# Patient Record
Sex: MALE | Race: WHITE | URBAN - METROPOLITAN AREA
[De-identification: names, ages, dates, MRNs, and addresses within clinical notes are randomized per-mention and may not be internally consistent; named-entity substitution may affect disease eponyms.]

---

## 2024-01-25 ENCOUNTER — OFFICE VISIT (OUTPATIENT)
Dept: SURGERY | Facility: CLINIC | Age: 44
End: 2024-01-25

## 2024-01-25 DIAGNOSIS — N41.1 CHRONIC PROSTATITIS: ICD-10-CM

## 2024-01-25 DIAGNOSIS — R82.90 URINE FINDING: Primary | ICD-10-CM

## 2024-01-25 LAB
APPEARANCE: CLEAR
BILIRUBIN: NEGATIVE
GLUCOSE (URINE DIPSTICK): NEGATIVE MG/DL
KETONES (URINE DIPSTICK): NEGATIVE MG/DL
LEUKOCYTES: NEGATIVE
MULTISTIX LOT#: NORMAL NUMERIC
NITRITE, URINE: NEGATIVE
OCCULT BLOOD: NEGATIVE
PH, URINE: 7 (ref 4.5–8)
PROTEIN (URINE DIPSTICK): NEGATIVE MG/DL
SPECIFIC GRAVITY: 1.01 (ref 1–1.03)
URINE-COLOR: YELLOW
UROBILINOGEN,SEMI-QN: 0.2 MG/DL (ref 0–1.9)

## 2024-01-25 PROCEDURE — 81003 URINALYSIS AUTO W/O SCOPE: CPT | Performed by: SURGERY

## 2024-01-25 PROCEDURE — 99204 OFFICE O/P NEW MOD 45 MIN: CPT | Performed by: SURGERY

## 2024-01-25 RX ORDER — TAMSULOSIN HYDROCHLORIDE 0.4 MG/1
0.4 CAPSULE ORAL EVERY EVENING
Qty: 30 CAPSULE | Refills: 5 | Status: SHIPPED | OUTPATIENT
Start: 2024-01-25

## 2024-01-25 NOTE — PROGRESS NOTES
Urology Clinic Note    Primary Care Provider:  No primary care provider on file.     Chief Complaint:   Prostatitis, pelvic pain and pressure    HPI:   Timothy Houser is a 43 year old male with no significant PMH referred for prostatitis, pelvic pain and pressure.    He had an episode of more severe perineal pain and pressure back in August 2023.  It resolved after his PCP who prescribed him 1 month of antibiotics mostly, but since then he has been having intermittent perineal pressure.  He feels that this is not really made better by urination.  Slight improvement after ejaculation.  He denies gross hematuria, weak urinary stream, urinary urgency, frequency.  He denies constipation.  He does not sit for long periods of time mostly and works as a .    BRENTON today shows an approximately 40 g prostate with no nodules or tenderness, mildly  enlarged for his age.    He has a family history of breast cancer in his maternal aunt, no prostate cancer in his family.    Urinalysis: Negative    PSA:  No results found for: \"PSA\", \"PERCENTPSA\", \"PSAS\", \"PSAULTRA\", \"QPSA\", \"PSATOT\", \"TOTPSADX\", \"TOTPSASCREEN\"     History:   No past medical history on file.    No past surgical history on file.    No family history on file.    Social History     Socioeconomic History    Marital status: Unknown       Medications (Active prior to today's visit):  No current outpatient medications on file.       Allergies:  Not on File    Review of Systems:   A comprehensive 10-point review of systems was completed.  Pertinent positives and negatives are noted in the the HPI.    Physical Exam:   CONSTITUTIONAL: Well developed, well nourished, in no acute distress  NEUROLOGIC: Alert and oriented  HEAD: Normocephalic, atraumatic  EYES: Sclera non-icteric  ENT: Hearing intact, moist mucous membranes  NECK: No obvious goiter or masses  RESPIRATORY: Normal respiratory effort  SKIN: No evident rashes  ABDOMEN: Soft, non-tender,  non-distended  GENITOURINARY: Normal phallus, orthotopic meatus, normal bilateral testicles  DIGITAL RECTAL EXAM: ~40 gram prostate, no nodules or tenderness    Assessment & Plan:   Timothy Houser is a 43 year old male with no significant PMH referred for prostatitis, pelvic pain and pressure.    He had an episode of more severe perineal pain and pressure back in August 2023.  It resolved after his PCP who prescribed him 1 month of antibiotics mostly, but since then he has been having intermittent perineal pressure.  He feels that this is not really made better by urination.  Slight improvement after ejaculation.  He denies gross hematuria, weak urinary stream, urinary urgency, frequency.  He denies constipation.  He does not sit for long periods of time mostly and works as a .    BRENTON today shows an approximately 40 g prostate with no nodules or tenderness, mildly  enlarged for his age.    He has a family history of breast cancer in his maternal aunt, no prostate cancer in his family.    -NSAIDs x 2 weeks  -Warm baths  -Avoid prolonged sitting  -Start tamsulosin 0.4 mg nightly  -Return to clinic in 6 weeks  -If symptoms do not improve, consider pelvic floor PT  -Once symptoms improve check PSA given mild enlargement of the prostate for age    Medical Decision Making  Chronic prostatitis: Undiagnosed new problem  BPH: Undiagnosed new problem    Amount and/or Complexity of Data Reviewed  Labs: ordered.    Risk  OTC drugs.  Prescription drug management.      Arun Lopez MD  Staff Urologist  [facility]  Office: [phone]

## 2024-01-26 ENCOUNTER — TELEPHONE (OUTPATIENT)
Dept: SURGERY | Facility: CLINIC | Age: 44
End: 2024-01-26

## 2024-01-26 NOTE — TELEPHONE ENCOUNTER
Patient says while in th office yesterday 1/25, he had someone at the  schedule him on 37/ at 8:15 am. Patient calling because the card given indicates the date/time, but he can not view it on his mychart. Patient asking to be seen for 6 week follow up for that same day.   *Patient seems to have a duplicate chart, and I cancelled the appointment made in the in correct chart and marking for merge. Please call patient at [phone],thanks.

## 2024-03-07 ENCOUNTER — OFFICE VISIT (OUTPATIENT)
Dept: SURGERY | Facility: CLINIC | Age: 44
End: 2024-03-07

## 2024-03-07 DIAGNOSIS — N41.1 CHRONIC PROSTATITIS: Primary | ICD-10-CM

## 2024-03-07 DIAGNOSIS — R82.90 URINE FINDING: ICD-10-CM

## 2024-03-07 LAB
APPEARANCE: CLEAR
BILIRUBIN: NEGATIVE
GLUCOSE (URINE DIPSTICK): NEGATIVE MG/DL
KETONES (URINE DIPSTICK): NEGATIVE MG/DL
LEUKOCYTES: NEGATIVE
MULTISTIX LOT#: NORMAL NUMERIC
NITRITE, URINE: NEGATIVE
OCCULT BLOOD: NEGATIVE
PH, URINE: 6 (ref 4.5–8)
PROTEIN (URINE DIPSTICK): NEGATIVE MG/DL
SPECIFIC GRAVITY: 1.01 (ref 1–1.03)
URINE-COLOR: YELLOW
UROBILINOGEN,SEMI-QN: 0.2 MG/DL (ref 0–1.9)

## 2024-03-07 RX ORDER — OMEPRAZOLE 40 MG/1
40 CAPSULE, DELAYED RELEASE ORAL DAILY
COMMUNITY
Start: 2024-02-22

## 2024-03-07 NOTE — PROGRESS NOTES
Urology Clinic Note    Primary Care Provider:  Domo Gan     Chief Complaint:   Prostatitis, pelvic pain and pressure     HPI:   García Dalal is a 43 year old male with no significant PMH referred for prostatitis, pelvic pain and pressure.     He had an episode of more severe perineal pain and pressure back in August 2023.  It resolved after his PCP who prescribed him 1 month of antibiotics, but since then he has been having intermittent perineal pressure.  He feels that this is not really made better by urination.  Slight improvement after ejaculation.  He denies gross hematuria, weak urinary stream, urinary urgency, frequency.  He denies constipation.  He does not sit for long periods of time mostly and works as a .     ALANA last visit shows an approximately 40 g prostate with no nodules or tenderness, mildly  enlarged for his age.     He has a family history of breast cancer in his maternal aunt, no prostate cancer in his family.    I saw him for initial consult on 1/25/2024 and at that time I recommended NSAIDs x 2 weeks, starting tamsulosin, warm baths.    He feels moderate improvement in his symptoms since starting tamsulosin.  While he was fully improved but then came back slightly and he still has mild perineal pressure.  He remains on Aleve.  Given persistent mild symptoms he saw gastroenterologist and is getting a sigmoidoscopy.  I also discussed stopping Aleve and starting pelvic floor PT today and he agrees.    AUA symptom score is 1/1 with LUTS.    Urinalysis: Negative    PSA:  No results found for: \"PSA\", \"PERCENTPSA\", \"PSAS\", \"PSAULTRA\", \"QPSA\", \"PSATOT\", \"TOTPSADX\", \"TOTPSASCREEN\"     History:   No past medical history on file.    No past surgical history on file.    No family history on file.    Social History     Socioeconomic History    Marital status:        Medications (Active prior to today's visit):  Current Outpatient Medications   Medication Sig Dispense Refill     Omeprazole 40 MG Oral Capsule Delayed Release Take 1 capsule (40 mg total) by mouth daily.      tamsulosin 0.4 MG Oral Cap Take 1 capsule (0.4 mg total) by mouth every evening. 30 capsule 5       Allergies:  No Known Allergies    Review of Systems:   A comprehensive 10-point review of systems was completed.  Pertinent positives and negatives are noted in the the HPI.    Physical Exam:   CONSTITUTIONAL: Well developed, well nourished, in no acute distress  NEUROLOGIC: Alert and oriented  HEAD: Normocephalic, atraumatic  EYES: Sclera non-icteric  ENT: Hearing intact, moist mucous membranes  NECK: No obvious goiter or masses  RESPIRATORY: Normal respiratory effort  SKIN: No evident rashes  ABDOMEN: Soft, non-tender, non-distended    Assessment & Plan:   García Dalal is a 43 year old male with no significant PMH referred for prostatitis, pelvic pain and pressure.     He had an episode of more severe perineal pain and pressure back in August 2023.  It resolved after his PCP who prescribed him 1 month of antibiotics, but since then he has been having intermittent perineal pressure.  He feels that this is not really made better by urination.  Slight improvement after ejaculation.  He denies gross hematuria, weak urinary stream, urinary urgency, frequency.  He denies constipation.  He does not sit for long periods of time mostly and works as a .     ALANA last visit shows an approximately 40 g prostate with no nodules or tenderness, mildly  enlarged for his age.     He has a family history of breast cancer in his maternal aunt, no prostate cancer in his family.    I saw him for initial consult on 1/25/2024 and at that time I recommended NSAIDs x 2 weeks, starting tamsulosin, warm baths.    He feels moderate improvement in his symptoms since starting tamsulosin.  While he was fully improved but then came back slightly and he still has mild perineal pressure.  He remains on Aleve.  Given persistent mild  symptoms he saw gastroenterologist and is getting a sigmoidoscopy.  I also discussed stopping Aleve and starting pelvic floor PT today and he agrees.    -Continue tamsulosin  -Stop Aleve  -Aleve as needed for flareups  -Warm baths  -Pelvic floor PT    In total, 30 minutes were spent on this patient encounter (including chart review, patient history, physical, and counseling, documentation, and communication).    Buzz Winter MD  Staff Urologist  [facility]  Office: [phone]

## 2024-04-10 ENCOUNTER — TELEPHONE (OUTPATIENT)
Dept: PHYSICAL THERAPY | Facility: HOSPITAL | Age: 44
End: 2024-04-10

## 2024-05-15 ENCOUNTER — TELEPHONE (OUTPATIENT)
Dept: PHYSICAL THERAPY | Facility: HOSPITAL | Age: 44
End: 2024-05-15

## 2024-05-17 ENCOUNTER — OFFICE VISIT (OUTPATIENT)
Dept: PHYSICAL THERAPY | Age: 44
End: 2024-05-17
Attending: SURGERY
Payer: COMMERCIAL

## 2024-05-17 DIAGNOSIS — N41.1 CHRONIC PROSTATITIS: Primary | ICD-10-CM

## 2024-05-17 PROCEDURE — 97163 PT EVAL HIGH COMPLEX 45 MIN: CPT | Performed by: PHYSICAL THERAPIST

## 2024-05-17 NOTE — PROGRESS NOTES
MUSCULOSKELETAL AND PELVIC FLOOR EVALUATION:     Diagnosis:   Chronic prostatitis (N41.1)       Referring Provider: Buzz Winter  Date of Evaluation:    5/17/2024    Precautions:  None Next MD visit:   none scheduled  Date of Surgery: n/a       PATIENT SUMMARY   García Cherry is a 43 year old male  who presents to therapy today with complaints of low level pressure in the pelvic/prostate area. End of summer last year, had spasming in the prostate area, for 3-5 days, 10x an hour, it was intense pain. He does not know what triggered it.  The intense pain eventually got resolved but he started having some constant  pressure on the prostate area, he went to his PCP, was given antibiotics, did not seem to help, but the symptoms eventually became more random. But 2 weeks ago he woke up with severe pain again in the prostate area, went to have BM, and he did not have immediate relief,but the next day he felt better. In 2021 had vasectomy, and had L testicular pain until end of 2022. When he has pressure in the pelvic area, pressure is rated at 1-2/10, at worst is 7-8/10, felt like a charley horse in the prostate. Saw urologist, was prescribed flomax and feels that it has been helping. One of the plan that was discussed when he saw the urologist was pelvic PT.     Occupation/Activities: , runs 2x/week, bike riding casual  CRADI-8-- 28.13  PEDRO - 6: 29.17    García describes prior level of function ADLs independent with no pelvic pain. Pt goals include no longer have pressure in the pelvic/prostate area.  Past medical history was reviewed with García. Significant findings include  has no past medical history on file.     URINARY HABITS  Abdominal Pressure complaints: no  Urinary Frequency day/night: 1x sometimes  Urine Stream: good  Leaking occurs: no  Amount of leakage: n/a  Pad/Liners use: n/a  Number of pad/liner: n/a  Fluid Intake: n/a  Post void dribble: n/a  Position with urination: stand, at home  sometimes sit with BM, nighttime, sit  Complete bladder emptying: yes  Do you strain when you urinate: no  Do you ever leak urine without knowing it? no    BOWEL HABITS  Types of symptoms:   Frequency of bowel movements: 1  Stool consistency: [place] Stool Scale: 4  Do you strain with defecation: No   Laxative use: No, takes metamucil fiber, prebiotic/probiotic    SEXUAL HEALTH STATUS  Marinoff Scale: 0  History of Sexual Abuse: No  Sexual Barnhart Status: Active  Pain with erection: No  Ejaculation: no pain    ASSESSMENT  García presents to physical therapy evaluation with primary c/o low level pressure, pelvic pain. The results of the objective tests and measures show Pelvic floor muscles tightness, pelvic floor muscles weakness, decreased soft tissues mobility, decreased ability to relax PF muscles.  Functional deficits include but are not limited to decreased pelvic stability, pelvic pain affecting ease and comfort.  Signs and symptoms are consistent with diagnosis of Chronic prostatitis (N41.1), pelvic pain. Pt and PT discussed evaluation findings, pathology, POC and HEP.  Pt voiced understanding and performs HEP correctly without reported pain. Skilled Pelvic Physical Therapy is medically necessary to address the above impairments and reach functional goals.    OBJECTIVE:   Posture: No deviation noted  Pelvic Alignment: WNL  Gait: pt ambulates on level ground with normal mechanics.     External Palpation: non tender  Scars (location/surgery): R LAQ from appendectomy  Abdominal Wall Integrity: Fair  Diastasis Recti: none    Range Of Motion  Lumbar AROM screen: WNL  LE AROM screen: grossly WNL     Strength (MMT) 5/5 SD LE   Transverse Abdominis: 3/5    Flexibility Summary: WNL SD LE     Special Tests  ASLR - poor lumbar loading transfers with L    Informed consent for internal pelvic evaluation given: Yes    External Observation:   Voluntary contraction: present   Voluntary relaxation: present  Involuntary  contraction: absent  Involuntary relaxation: absent    Sensory/Reflex:  Anal [place]: normal bilaterally    Internal Examination   Scar: n/a    Pelvic Floor Muscle strength: (PERF= Power/Endurance/Reps/Fast) MMT: 4/5/5//10  External Anal Sphincter: 4/5, hypertonic, difficulty with digital insertion, unable to move finger through  Accessory Muscle Use: none    Eccentric lengthening contraction: Fair      Internal Palpation: WNL except Superficial Transverse Perineal B severe restriction  Deep Transverse Perineal B severe restriction  Iliococcygeus B severe restriction  Coccygeus B severe restriction  Obturator Internus B severe restriction   Puborectalis B severe restriction    Today's Treatment and Response:   Patient education was provided on objective findings of external and internal evaluation and expectations with treatment outcomes. Educated on pelvic anatomy and function with diagrams and pelvic model, TNE, and diaphragmatic breathing for PNS activation and pelvic floor relaxation     Patient was instructed in and issued a HEP for: diaphragmatic breathing, PF relaxation    Charges: PT Eval High Complexity,       Total Timed Treatment: 50 min     Total Treatment Time: 55 min     Based on clinical rationale and outcome measures, this evaluation involved High Complexity decision making due to 3+ personal factors/comorbidities, 3 body structures involved/activity limitations, and evolving symptoms including pelvic pain  PLAN OF CARE:    Goals: (to be met in 4-8 visits)  Patient will have increased awareness for PF muscles contractions and relaxation to improve ability to promote relaxation and have ease with digital rectal insertion (4 visits)  Patient will demonstrate decreased PF muscles tightness, decreased STRs to mild to none, to facilitate soft tissues mobility and allow PF muscles to relax and accommodate  during digital PF assessment.  Patient will exhibit improvement of hips, core, abdominal muscles  strength to 5/5 to improve lumbar stability, improve ability of patient to perform daily and work related activities with no apprehension for pain or difficulty.  Patient will have improvement of PF muscles strength using the Laycock Scale to 5/10/10//10, to improve efficiency of PF contractions,  improve PF stabilization, and report decreased pelvic pressure/ pain to none.  Patient will demonstrate improved coordination of core and PF muscles, including proper respiration to facilitate bowel, bladder, sexual functions and minimize symptoms of pain  Patient will verbalize understanding of PF functions, knowledge of normal bowel, bladder sexual mechanics, and utilize an established HEP to manage symptoms     Frequency / Duration: Patient will be seen for 1 x/week or a total of 8 visits over a 90 day period.  Treatment will include: Manual Therapy, Neuromuscular Re-education, Therapeutic Exercise, and Home Exercise Program instruction , NMR, TE    Education or treatment limitation: None  Rehab Potential:good      Patient/Family/Caregiver was advised of these findings, precautions, and treatment options and has agreed to actively participate in planning and for this course of care.    Thank you for your referral. Please co-sign or sign and return this letter via fax as soon as possible to [phone]. If you have any questions, please contact me at Dept: [phone]    Sincerely,  Electronically signed by therapist: Stefany Schrader PT  Physician's certification required: Yes  I certify the need for these services furnished under this plan of treatment and while under my care.    X___________________________________________________ Date____________________    Certification From: 5/17/2024  To:8/15/2024

## 2024-05-21 ENCOUNTER — APPOINTMENT (OUTPATIENT)
Dept: PHYSICAL THERAPY | Age: 44
End: 2024-05-21
Attending: SURGERY
Payer: COMMERCIAL

## 2024-05-28 ENCOUNTER — OFFICE VISIT (OUTPATIENT)
Dept: PHYSICAL THERAPY | Age: 44
End: 2024-05-28
Attending: SURGERY
Payer: COMMERCIAL

## 2024-05-28 PROCEDURE — 97110 THERAPEUTIC EXERCISES: CPT | Performed by: PHYSICAL THERAPIST

## 2024-05-28 PROCEDURE — 97112 NEUROMUSCULAR REEDUCATION: CPT | Performed by: PHYSICAL THERAPIST

## 2024-05-28 NOTE — PROGRESS NOTES
Diagnosis:   Chronic prostatitis (N41.1)          Referring Provider: Buzz Winter  Date of Evaluation:    5/17/2024    Precautions:  None Next MD visit:   none scheduled  Date of Surgery: n/a   Insurance Primary/Secondary: [employer] [place] PPO / N/A     # Auth Visits: n/a            Subjective: Had pressure, low level, but annoying that it persists.     Pain: 1/10      Objective: RT initially elevated in supine and standing, with PF muscles relaxations and diaphragmatic breathing RT eventually decreased. sEMG RT in supine, ave. 5-6 microvolts, lowest 3.9 microvolts. In Standing ave 8-9 Microvolts, lowest 5 Microvolts.      Assessment: Initiated therapeutic exercises today. Performed NMR/ biofeedback using rectal sensor and handheld equipment to determine RT, improve awareness for PF contractions and relaxations. Treatment time includes explanation of intended effect of each intervention, instruction before and during exercises, including cues for proper form and performance, assessment of patient's response to each activity and education to improve performance of active intervention and good comprehension of treatment plan to improve patient participation and compliance. Intervention adjusted to patient's tolerance throughout session duration.       Goals:   Goals: (to be met in 4-8 visits)  Patient will have increased awareness for PF muscles contractions and relaxation to improve ability to promote relaxation and have ease with digital rectal insertion (4 visits)  Patient will demonstrate decreased PF muscles tightness, decreased STRs to mild to none, to facilitate soft tissues mobility and allow PF muscles to relax and accommodate  during digital PF assessment.  Patient will exhibit improvement of hips, core, abdominal muscles strength to 5/5 to improve lumbar stability, improve ability of patient to perform daily and work related activities with no apprehension for pain or difficulty.  Patient will have improvement of PF  muscles strength using the Laycock Scale to 5/10/10//10, to improve efficiency of PF contractions,  improve PF stabilization, and report decreased pelvic pressure/ pain to none.  Patient will demonstrate improved coordination of core and PF muscles, including proper respiration to facilitate bowel, bladder, sexual functions and minimize symptoms of pain  Patient will verbalize understanding of PF functions, knowledge of normal bowel, bladder sexual mechanics, and utilize an established HEP to manage symptoms     Plan: Continue PT as per established POC. NMR next visit, increase intensity of exercises  Date: 5/28/2024  TX#: 2/8 Date:                 TX#: 3/ Date:                 TX#: 4/ Date:                 TX#: 5/ Date:   Tx#: 6/   Hamstrings stretches with strap 2x 30 secs  Piriformis stretches 2x 30 secs (fig 4 no lift)  Modified [place] stretches 2x 30 secs  Happy baby 2x 30 secs  Thera ex - 15 mins        NMR for 15 mins - for Pelvic Floor (PF) muscles and improve awareness for contractions and relaxations, improve coordination with respiration using rectal sensor, verbal cues,with handheld device   Isolated PF contractions x 10 reps with 5 secs hold, 10 secs relaxations done in supine and standing                     HEP: PF muscles relaxation    Charges: Thera -ex   -1, NMR - 1     Total Timed Treatment: 30 min  Total Treatment Time: 30 min

## 2024-06-07 ENCOUNTER — APPOINTMENT (OUTPATIENT)
Dept: PHYSICAL THERAPY | Age: 44
End: 2024-06-07
Attending: SURGERY
Payer: COMMERCIAL

## 2024-06-14 ENCOUNTER — APPOINTMENT (OUTPATIENT)
Dept: PHYSICAL THERAPY | Age: 44
End: 2024-06-14
Attending: SURGERY
Payer: COMMERCIAL